# Patient Record
Sex: FEMALE | Race: WHITE
[De-identification: names, ages, dates, MRNs, and addresses within clinical notes are randomized per-mention and may not be internally consistent; named-entity substitution may affect disease eponyms.]

---

## 2017-07-11 ENCOUNTER — HOSPITAL ENCOUNTER (OUTPATIENT)
Dept: HOSPITAL 19 - SDCO | Age: 62
Discharge: HOME | End: 2017-07-11
Attending: SURGERY
Payer: COMMERCIAL

## 2017-07-11 VITALS — SYSTOLIC BLOOD PRESSURE: 92 MMHG | DIASTOLIC BLOOD PRESSURE: 64 MMHG | HEART RATE: 51 BPM

## 2017-07-11 VITALS — HEART RATE: 87 BPM | TEMPERATURE: 98.3 F | DIASTOLIC BLOOD PRESSURE: 75 MMHG | SYSTOLIC BLOOD PRESSURE: 112 MMHG

## 2017-07-11 VITALS — DIASTOLIC BLOOD PRESSURE: 63 MMHG | SYSTOLIC BLOOD PRESSURE: 95 MMHG | HEART RATE: 67 BPM

## 2017-07-11 VITALS — HEART RATE: 50 BPM | DIASTOLIC BLOOD PRESSURE: 67 MMHG | SYSTOLIC BLOOD PRESSURE: 98 MMHG

## 2017-07-11 VITALS — TEMPERATURE: 97.7 F | DIASTOLIC BLOOD PRESSURE: 77 MMHG | HEART RATE: 69 BPM | SYSTOLIC BLOOD PRESSURE: 107 MMHG

## 2017-07-11 VITALS — HEIGHT: 67 IN | WEIGHT: 192.24 LBS | BODY MASS INDEX: 30.17 KG/M2

## 2017-07-11 VITALS — HEART RATE: 68 BPM | DIASTOLIC BLOOD PRESSURE: 74 MMHG | SYSTOLIC BLOOD PRESSURE: 104 MMHG

## 2017-07-11 VITALS — SYSTOLIC BLOOD PRESSURE: 99 MMHG | DIASTOLIC BLOOD PRESSURE: 66 MMHG | HEART RATE: 84 BPM

## 2017-07-11 DIAGNOSIS — M47.896: ICD-10-CM

## 2017-07-11 DIAGNOSIS — J84.9: ICD-10-CM

## 2017-07-11 DIAGNOSIS — Z80.0: Primary | ICD-10-CM

## 2017-07-11 DIAGNOSIS — E11.8: ICD-10-CM

## 2017-07-11 DIAGNOSIS — E78.5: ICD-10-CM

## 2017-07-11 DIAGNOSIS — M06.9: ICD-10-CM

## 2017-07-11 DIAGNOSIS — I10: ICD-10-CM

## 2017-07-11 DIAGNOSIS — R10.32: ICD-10-CM

## 2017-07-11 DIAGNOSIS — J45.909: ICD-10-CM

## 2017-07-11 DIAGNOSIS — I73.00: ICD-10-CM

## 2017-07-11 DIAGNOSIS — G47.30: ICD-10-CM

## 2017-07-11 DIAGNOSIS — G89.29: ICD-10-CM

## 2017-07-11 DIAGNOSIS — K92.1: ICD-10-CM

## 2017-07-11 DIAGNOSIS — M47.892: ICD-10-CM

## 2017-08-14 ENCOUNTER — HOSPITAL ENCOUNTER (OUTPATIENT)
Dept: HOSPITAL 19 - COL.RAD | Age: 62
End: 2017-08-14
Attending: PHYSICIAN ASSISTANT
Payer: COMMERCIAL

## 2017-08-14 DIAGNOSIS — R90.82: Primary | ICD-10-CM

## 2017-08-14 PROCEDURE — A9585 GADOBUTROL INJECTION: HCPCS

## 2017-08-18 ENCOUNTER — HOSPITAL ENCOUNTER (OUTPATIENT)
Dept: HOSPITAL 19 - MC.RAD | Age: 62
End: 2017-08-18
Attending: INTERNAL MEDICINE
Payer: COMMERCIAL

## 2017-08-18 DIAGNOSIS — Z12.31: Primary | ICD-10-CM

## 2017-10-26 ENCOUNTER — HOSPITAL ENCOUNTER (INPATIENT)
Dept: HOSPITAL 19 - JCC | Age: 62
LOS: 49 days | Discharge: HOME | DRG: 470 | End: 2017-12-14
Attending: ORTHOPAEDIC SURGERY | Admitting: ORTHOPAEDIC SURGERY
Payer: COMMERCIAL

## 2017-10-26 VITALS — WEIGHT: 204.59 LBS | BODY MASS INDEX: 32.11 KG/M2 | HEIGHT: 67 IN

## 2017-10-26 DIAGNOSIS — M17.11: Primary | ICD-10-CM

## 2017-10-26 DIAGNOSIS — M06.9: ICD-10-CM

## 2017-10-26 DIAGNOSIS — E11.9: ICD-10-CM

## 2017-10-26 DIAGNOSIS — I73.00: ICD-10-CM

## 2017-10-26 PROCEDURE — A4314 CATH W/DRAINAGE 2-WAY LATEX: HCPCS

## 2017-10-26 PROCEDURE — A9284 NON-ELECTRONIC SPIROMETER: HCPCS

## 2017-10-26 PROCEDURE — C1713 ANCHOR/SCREW BN/BN,TIS/BN: HCPCS

## 2017-10-26 PROCEDURE — C1776 JOINT DEVICE (IMPLANTABLE): HCPCS

## 2017-12-08 ENCOUNTER — HOSPITAL ENCOUNTER (OUTPATIENT)
Dept: HOSPITAL 19 - MKS.ESL.PT | Age: 62
LOS: 5 days | Discharge: HOME | End: 2017-12-13
Attending: INTERNAL MEDICINE
Payer: COMMERCIAL

## 2017-12-08 ENCOUNTER — HOSPITAL ENCOUNTER (OUTPATIENT)
Dept: HOSPITAL 19 - COL.LAB | Age: 62
End: 2017-12-08
Attending: ORTHOPAEDIC SURGERY
Payer: COMMERCIAL

## 2017-12-08 DIAGNOSIS — M17.11: ICD-10-CM

## 2017-12-08 DIAGNOSIS — Z79.82: ICD-10-CM

## 2017-12-08 DIAGNOSIS — M54.2: Primary | ICD-10-CM

## 2017-12-08 DIAGNOSIS — Z79.84: ICD-10-CM

## 2017-12-08 DIAGNOSIS — Z01.812: Primary | ICD-10-CM

## 2017-12-12 VITALS — SYSTOLIC BLOOD PRESSURE: 103 MMHG | HEART RATE: 45 BPM | DIASTOLIC BLOOD PRESSURE: 67 MMHG | TEMPERATURE: 98 F

## 2017-12-12 VITALS — SYSTOLIC BLOOD PRESSURE: 107 MMHG | HEART RATE: 66 BPM | DIASTOLIC BLOOD PRESSURE: 69 MMHG | TEMPERATURE: 98 F

## 2017-12-12 VITALS — DIASTOLIC BLOOD PRESSURE: 71 MMHG | HEART RATE: 44 BPM | TEMPERATURE: 98.2 F | SYSTOLIC BLOOD PRESSURE: 116 MMHG

## 2017-12-12 VITALS — SYSTOLIC BLOOD PRESSURE: 116 MMHG | DIASTOLIC BLOOD PRESSURE: 71 MMHG | HEART RATE: 50 BPM | TEMPERATURE: 98 F

## 2017-12-12 VITALS — HEART RATE: 50 BPM | DIASTOLIC BLOOD PRESSURE: 53 MMHG | SYSTOLIC BLOOD PRESSURE: 110 MMHG | TEMPERATURE: 98 F

## 2017-12-12 VITALS — TEMPERATURE: 98.6 F

## 2017-12-12 VITALS — TEMPERATURE: 98.3 F | HEART RATE: 87 BPM | DIASTOLIC BLOOD PRESSURE: 72 MMHG | SYSTOLIC BLOOD PRESSURE: 116 MMHG

## 2017-12-12 VITALS — TEMPERATURE: 98.6 F | SYSTOLIC BLOOD PRESSURE: 102 MMHG | DIASTOLIC BLOOD PRESSURE: 58 MMHG | HEART RATE: 57 BPM

## 2017-12-12 VITALS — HEART RATE: 68 BPM | SYSTOLIC BLOOD PRESSURE: 110 MMHG | DIASTOLIC BLOOD PRESSURE: 70 MMHG | TEMPERATURE: 97.7 F

## 2017-12-12 VITALS — SYSTOLIC BLOOD PRESSURE: 110 MMHG | TEMPERATURE: 98 F | HEART RATE: 68 BPM | DIASTOLIC BLOOD PRESSURE: 70 MMHG

## 2017-12-12 VITALS — HEART RATE: 63 BPM | TEMPERATURE: 98 F | SYSTOLIC BLOOD PRESSURE: 139 MMHG | DIASTOLIC BLOOD PRESSURE: 72 MMHG

## 2017-12-12 VITALS — HEART RATE: 62 BPM | TEMPERATURE: 98 F | DIASTOLIC BLOOD PRESSURE: 60 MMHG | SYSTOLIC BLOOD PRESSURE: 103 MMHG

## 2017-12-12 VITALS — SYSTOLIC BLOOD PRESSURE: 117 MMHG | TEMPERATURE: 98 F | DIASTOLIC BLOOD PRESSURE: 61 MMHG | HEART RATE: 92 BPM

## 2017-12-12 PROCEDURE — 0SBC0ZX EXCISION OF RIGHT KNEE JOINT, OPEN APPROACH, DIAGNOSTIC: ICD-10-PCS | Performed by: ORTHOPAEDIC SURGERY

## 2017-12-12 PROCEDURE — 0SRC0J9 REPLACEMENT OF RIGHT KNEE JOINT WITH SYNTHETIC SUBSTITUTE, CEMENTED, OPEN APPROACH: ICD-10-PCS | Performed by: ORTHOPAEDIC SURGERY

## 2017-12-13 VITALS — HEART RATE: 57 BPM | TEMPERATURE: 97 F | DIASTOLIC BLOOD PRESSURE: 59 MMHG | SYSTOLIC BLOOD PRESSURE: 109 MMHG

## 2017-12-13 VITALS — HEART RATE: 54 BPM | SYSTOLIC BLOOD PRESSURE: 102 MMHG | DIASTOLIC BLOOD PRESSURE: 69 MMHG | TEMPERATURE: 98.7 F

## 2017-12-13 VITALS — DIASTOLIC BLOOD PRESSURE: 56 MMHG | HEART RATE: 50 BPM | SYSTOLIC BLOOD PRESSURE: 109 MMHG | TEMPERATURE: 98 F

## 2017-12-13 VITALS — TEMPERATURE: 97.9 F | SYSTOLIC BLOOD PRESSURE: 113 MMHG | DIASTOLIC BLOOD PRESSURE: 59 MMHG | HEART RATE: 52 BPM

## 2017-12-13 VITALS — HEART RATE: 59 BPM | DIASTOLIC BLOOD PRESSURE: 59 MMHG | TEMPERATURE: 98.7 F | SYSTOLIC BLOOD PRESSURE: 107 MMHG

## 2017-12-13 LAB
HCT VFR BLD AUTO: 35.8 % (ref 37–47)
HGB BLD-MCNC: 12 G/DL (ref 12.5–16)

## 2017-12-14 VITALS — DIASTOLIC BLOOD PRESSURE: 64 MMHG | TEMPERATURE: 98.5 F | SYSTOLIC BLOOD PRESSURE: 101 MMHG | HEART RATE: 55 BPM

## 2017-12-14 VITALS — SYSTOLIC BLOOD PRESSURE: 121 MMHG | HEART RATE: 59 BPM | TEMPERATURE: 98.4 F | DIASTOLIC BLOOD PRESSURE: 65 MMHG

## 2017-12-14 VITALS — DIASTOLIC BLOOD PRESSURE: 64 MMHG | TEMPERATURE: 98.2 F | HEART RATE: 50 BPM | SYSTOLIC BLOOD PRESSURE: 109 MMHG

## 2017-12-29 ENCOUNTER — HOSPITAL ENCOUNTER (OUTPATIENT)
Dept: HOSPITAL 19 - COL.VAS | Age: 62
End: 2017-12-29
Attending: ORTHOPAEDIC SURGERY
Payer: COMMERCIAL

## 2017-12-29 DIAGNOSIS — M79.604: ICD-10-CM

## 2017-12-29 DIAGNOSIS — M79.89: Primary | ICD-10-CM

## 2017-12-29 DIAGNOSIS — Z96.651: ICD-10-CM

## 2018-03-16 ENCOUNTER — HOSPITAL ENCOUNTER (OUTPATIENT)
Dept: HOSPITAL 19 - MKS.ESL.PT | Age: 63
LOS: 2 days | Discharge: HOME | End: 2018-03-18
Attending: ORTHOPAEDIC SURGERY
Payer: COMMERCIAL

## 2018-03-16 DIAGNOSIS — Z96.651: ICD-10-CM

## 2018-03-16 DIAGNOSIS — Z47.1: Primary | ICD-10-CM

## 2018-04-03 ENCOUNTER — HOSPITAL ENCOUNTER (OUTPATIENT)
Dept: HOSPITAL 19 - MKS.ESL.PT | Age: 63
LOS: 1 days | Discharge: HOME | End: 2018-04-04
Attending: ORTHOPAEDIC SURGERY
Payer: COMMERCIAL

## 2018-04-03 DIAGNOSIS — Z96.651: ICD-10-CM

## 2018-04-03 DIAGNOSIS — Z47.1: Primary | ICD-10-CM

## 2018-04-18 ENCOUNTER — HOSPITAL ENCOUNTER (OUTPATIENT)
Dept: HOSPITAL 19 - COL.RAD | Age: 63
End: 2018-04-18
Attending: ORTHOPAEDIC SURGERY
Payer: COMMERCIAL

## 2018-04-18 DIAGNOSIS — Z96.651: ICD-10-CM

## 2018-04-18 DIAGNOSIS — M79.89: Primary | ICD-10-CM

## 2018-04-18 DIAGNOSIS — M25.561: ICD-10-CM

## 2018-06-29 ENCOUNTER — HOSPITAL ENCOUNTER (OUTPATIENT)
Dept: HOSPITAL 19 - COL.RAD | Age: 63
End: 2018-06-29
Attending: ORTHOPAEDIC SURGERY
Payer: COMMERCIAL

## 2018-06-29 DIAGNOSIS — S93.492A: ICD-10-CM

## 2018-06-29 DIAGNOSIS — M72.2: Primary | ICD-10-CM

## 2018-06-29 DIAGNOSIS — S86.312A: ICD-10-CM

## 2019-03-07 ENCOUNTER — HOSPITAL ENCOUNTER (OUTPATIENT)
Dept: HOSPITAL 19 - WSC | Age: 64
LOS: 3 days | Discharge: HOME | End: 2019-03-10
Attending: INTERNAL MEDICINE
Payer: COMMERCIAL

## 2019-03-07 DIAGNOSIS — M19.90: ICD-10-CM

## 2019-03-07 DIAGNOSIS — M06.9: Primary | ICD-10-CM

## 2019-03-14 ENCOUNTER — HOSPITAL ENCOUNTER (OUTPATIENT)
Dept: HOSPITAL 19 - WSC | Age: 64
LOS: 90 days | End: 2019-06-12
Attending: INTERNAL MEDICINE
Payer: COMMERCIAL

## 2019-03-14 DIAGNOSIS — M06.9: Primary | ICD-10-CM

## 2019-03-14 DIAGNOSIS — M19.90: ICD-10-CM

## 2019-09-11 ENCOUNTER — HOSPITAL ENCOUNTER (OUTPATIENT)
Dept: HOSPITAL 19 - COL.RAD | Age: 64
End: 2019-09-11
Attending: ORTHOPAEDIC SURGERY
Payer: COMMERCIAL

## 2019-09-11 DIAGNOSIS — M43.16: ICD-10-CM

## 2019-09-11 DIAGNOSIS — M51.36: Primary | ICD-10-CM

## 2019-09-11 DIAGNOSIS — M12.88: ICD-10-CM

## 2019-09-11 DIAGNOSIS — M48.061: ICD-10-CM

## 2019-12-31 ENCOUNTER — HOSPITAL ENCOUNTER (OUTPATIENT)
Dept: HOSPITAL 19 - MC.RAD | Age: 64
End: 2019-12-31
Attending: INTERNAL MEDICINE
Payer: COMMERCIAL

## 2019-12-31 DIAGNOSIS — Z12.31: Primary | ICD-10-CM

## 2020-01-23 ENCOUNTER — HOSPITAL ENCOUNTER (OUTPATIENT)
Dept: HOSPITAL 19 - MC.RAD | Age: 65
End: 2020-01-23
Attending: INTERNAL MEDICINE
Payer: COMMERCIAL

## 2020-01-23 DIAGNOSIS — N63.20: Primary | ICD-10-CM

## 2020-07-10 ENCOUNTER — HOSPITAL ENCOUNTER (OUTPATIENT)
Dept: HOSPITAL 19 - MC.RAD | Age: 65
End: 2020-07-10
Attending: INTERNAL MEDICINE
Payer: COMMERCIAL

## 2020-07-10 DIAGNOSIS — M06.9: ICD-10-CM

## 2020-07-10 DIAGNOSIS — M06.39: ICD-10-CM

## 2020-07-10 DIAGNOSIS — N63.22: Primary | ICD-10-CM

## 2020-12-17 ENCOUNTER — HOSPITAL ENCOUNTER (OUTPATIENT)
Dept: HOSPITAL 19 - COL.RAD | Age: 65
End: 2020-12-17
Attending: ORTHOPAEDIC SURGERY
Payer: COMMERCIAL

## 2020-12-17 DIAGNOSIS — M84.374A: Primary | ICD-10-CM

## 2021-01-05 ENCOUNTER — HOSPITAL ENCOUNTER (OUTPATIENT)
Dept: HOSPITAL 19 - MC.RAD | Age: 66
End: 2021-01-05
Attending: INTERNAL MEDICINE
Payer: COMMERCIAL

## 2021-01-05 DIAGNOSIS — Z12.31: Primary | ICD-10-CM

## 2022-03-22 ENCOUNTER — HOSPITAL ENCOUNTER (OUTPATIENT)
Dept: HOSPITAL 19 - MC.RAD | Age: 67
End: 2022-03-22
Attending: INTERNAL MEDICINE
Payer: COMMERCIAL

## 2022-03-22 DIAGNOSIS — Z12.31: Primary | ICD-10-CM

## 2024-09-24 ENCOUNTER — HOSPITAL ENCOUNTER (OUTPATIENT)
Dept: HOSPITAL 19 - SDCO | Age: 69
LOS: 1 days | Discharge: HOME | End: 2024-09-25
Attending: UROLOGY
Payer: COMMERCIAL

## 2024-09-24 VITALS — DIASTOLIC BLOOD PRESSURE: 64 MMHG | SYSTOLIC BLOOD PRESSURE: 121 MMHG | TEMPERATURE: 98.1 F | HEART RATE: 81 BPM

## 2024-09-24 VITALS — HEART RATE: 72 BPM | DIASTOLIC BLOOD PRESSURE: 60 MMHG | TEMPERATURE: 98.1 F | SYSTOLIC BLOOD PRESSURE: 115 MMHG

## 2024-09-24 VITALS — HEART RATE: 79 BPM | DIASTOLIC BLOOD PRESSURE: 62 MMHG | TEMPERATURE: 98.1 F | SYSTOLIC BLOOD PRESSURE: 115 MMHG

## 2024-09-24 VITALS — WEIGHT: 212.53 LBS | BODY MASS INDEX: 34.16 KG/M2 | HEIGHT: 66 IN

## 2024-09-24 VITALS — SYSTOLIC BLOOD PRESSURE: 119 MMHG | DIASTOLIC BLOOD PRESSURE: 60 MMHG | HEART RATE: 72 BPM | TEMPERATURE: 98.1 F

## 2024-09-24 VITALS — DIASTOLIC BLOOD PRESSURE: 69 MMHG | HEART RATE: 89 BPM | SYSTOLIC BLOOD PRESSURE: 109 MMHG | TEMPERATURE: 98.8 F

## 2024-09-24 VITALS — DIASTOLIC BLOOD PRESSURE: 72 MMHG | HEART RATE: 81 BPM | TEMPERATURE: 98.5 F | SYSTOLIC BLOOD PRESSURE: 109 MMHG

## 2024-09-24 VITALS — HEART RATE: 78 BPM | DIASTOLIC BLOOD PRESSURE: 61 MMHG | TEMPERATURE: 98.1 F | SYSTOLIC BLOOD PRESSURE: 120 MMHG

## 2024-09-24 VITALS — HEART RATE: 78 BPM | DIASTOLIC BLOOD PRESSURE: 61 MMHG | SYSTOLIC BLOOD PRESSURE: 113 MMHG | TEMPERATURE: 98.1 F

## 2024-09-24 VITALS — SYSTOLIC BLOOD PRESSURE: 109 MMHG

## 2024-09-24 VITALS — SYSTOLIC BLOOD PRESSURE: 129 MMHG | TEMPERATURE: 97.4 F | HEART RATE: 87 BPM | DIASTOLIC BLOOD PRESSURE: 72 MMHG

## 2024-09-24 VITALS — HEART RATE: 72 BPM | SYSTOLIC BLOOD PRESSURE: 119 MMHG | TEMPERATURE: 98.1 F | DIASTOLIC BLOOD PRESSURE: 59 MMHG

## 2024-09-24 DIAGNOSIS — E11.9: ICD-10-CM

## 2024-09-24 DIAGNOSIS — D30.01: Primary | ICD-10-CM

## 2024-09-24 LAB
ANION GAP SERPL CALC-SCNC: 10 MMOL/L (ref 7–16)
BUN SERPL-MCNC: 16 MG/DL (ref 10–20)
CALCIUM SERPL-MCNC: 8.6 MG/DL (ref 8.4–10.2)
CHLORIDE SERPL-SCNC: 110 MEQ/L (ref 98–107)
CREAT SERPL-SCNC: 0.85 MG/DL (ref 0.57–1.11)
GLUCOSE SERPL-MCNC: 203 MG/DL (ref 70–99)
POTASSIUM SERPL-SCNC: 4.2 MEQ/L (ref 3.5–4.5)
SODIUM SERPL-SCNC: 141 MEQ/L (ref 136–145)

## 2024-09-24 PROCEDURE — A9284 NON-ELECTRONIC SPIROMETER: HCPCS

## 2024-09-24 PROCEDURE — A4314 CATH W/DRAINAGE 2-WAY LATEX: HCPCS

## 2024-09-24 NOTE — NUR
Patient to room 327 from the PACU. Alert, but drowsy. Family at the bedside.
VSS. IV CDI, fluids by gravity. Lap sitesx7 CDI. Walker intact. [place] over
ABD for comfort. Post op VS  monitored. Nurse oriented the patient to
location, room and call light. Call light within reach

## 2024-09-24 NOTE — NUR
met with patient and her , Vijay, P# [phone] or his
work P# is [phone], to discuss discharge planning.  Patient lives in
[place] with her .  PCP is Dr. Holguin, Pharmacy is [facility].
 No issues affording medications.  INsurance is [employer].  DPOA-HC is Vijay.  No
DME, patient reports to be independent with [facility].  Patient reports she has
transportation to get to and from appointments. Patient would like to return
home at time of discharge.
 
Discharge plan: Home

## 2024-09-25 VITALS — DIASTOLIC BLOOD PRESSURE: 74 MMHG | TEMPERATURE: 98.2 F | SYSTOLIC BLOOD PRESSURE: 130 MMHG | HEART RATE: 75 BPM

## 2024-09-25 VITALS — HEART RATE: 84 BPM | SYSTOLIC BLOOD PRESSURE: 109 MMHG | DIASTOLIC BLOOD PRESSURE: 61 MMHG | TEMPERATURE: 98.9 F

## 2024-09-25 VITALS — SYSTOLIC BLOOD PRESSURE: 109 MMHG

## 2024-09-25 VITALS — SYSTOLIC BLOOD PRESSURE: 112 MMHG | HEART RATE: 75 BPM | DIASTOLIC BLOOD PRESSURE: 69 MMHG | TEMPERATURE: 98.1 F

## 2024-09-25 VITALS — DIASTOLIC BLOOD PRESSURE: 67 MMHG | TEMPERATURE: 98 F | SYSTOLIC BLOOD PRESSURE: 103 MMHG | HEART RATE: 75 BPM

## 2024-09-25 VITALS — SYSTOLIC BLOOD PRESSURE: 130 MMHG

## 2024-09-25 VITALS — SYSTOLIC BLOOD PRESSURE: 125 MMHG

## 2024-09-25 VITALS — TEMPERATURE: 98.5 F | DIASTOLIC BLOOD PRESSURE: 75 MMHG | SYSTOLIC BLOOD PRESSURE: 125 MMHG | HEART RATE: 86 BPM

## 2024-09-25 VITALS — SYSTOLIC BLOOD PRESSURE: 112 MMHG

## 2024-09-25 LAB
BASOPHILS # BLD: 0 K/MM3 (ref 0–0.2)
BASOPHILS NFR BLD AUTO: 0.1 % (ref 0–2)
EOSINOPHIL # BLD: 0 K/MM3 (ref 0–0.7)
EOSINOPHIL NFR BLD: 0 % (ref 0–4)
ERYTHROCYTE [DISTWIDTH] IN BLOOD BY AUTOMATED COUNT: 11.9 % (ref 11.5–14.5)
GRANULOCYTES # BLD AUTO: 85.8 % (ref 42.2–75.2)
HCT VFR BLD AUTO: 33.9 % (ref 37–47)
HGB BLD-MCNC: 11.8 G/DL (ref 12.5–16)
LYMPHOCYTES # BLD: 1.4 K/MM3 (ref 1.2–3.4)
LYMPHOCYTES NFR BLD: 9.5 % (ref 20–51)
MCH RBC QN AUTO: 30 PG (ref 27–31)
MCHC RBC AUTO-ENTMCNC: 35 G/DL (ref 33–37)
MCV RBC AUTO: 87 FL (ref 80–100)
MONOCYTES # BLD: 0.6 K/MM3 (ref 0.1–0.6)
MONOCYTES NFR BLD AUTO: 3.9 % (ref 1.7–9.3)
NEUTROPHILS # BLD: 12.2 K/MM3 (ref 1.4–6.5)
PLATELET # BLD AUTO: 171 K/MM3 (ref 130–400)
PMV BLD AUTO: 9.3 FL (ref 7.4–10.4)
RBC # BLD AUTO: 3.9 M/MM3 (ref 4.1–5.3)

## 2024-09-25 NOTE — NUR
DISCHARGE INSTRUCTIONS REVIEWED WITH PT. QUESTIONS SOLICITED AND ANSWERED. PT
LEFT UNIT PER WHEEL CHAIR.

## 2024-09-25 NOTE — NUR
PT RESTING IN BED. DR SINGH IN TO ROUND AND GIVE NEW ORDERS. VERDIN CATHETER
DISCONTINUED. PT TOLERATED WELL. PT AMBULATING IN [place] INDEPENDENTLY WITH
FAMILY. EATING AND DRINKING NO N/V.

## 2024-09-25 NOTE — NUR
REQUESTED AND GIVEN PAIN MEDS FOR COMFORT MAINTENANCE. DENIES NAUSEA, IV
FLUIDS INFUSING WITH NO PROBLEMS. VERDIN TO DD DRAINING CLEAR YELLOW.

## 2024-09-25 NOTE — NUR
REPORTED GAS PAIN TO ABD, SEE MAR FOR MORPHINE IV GIVEN FOR LEVEL 7/10 PAIN.
DENIES NAUSEA. DENIES ANY OTHER NEEDS AT THIS TIME. IV FLUIDS INFUSING WITH NO
PROBLEMS. WARM BLANKET TO ABD APPLIED FOR COMFORT AS WELL. declines

## 2024-09-25 NOTE — NUR
DISCHARGE INSTRUCTIONS REVIEWED WITH PT'S SON. VERBALIZED UNDERSTANDING LEFT
FLOOR PER WHEEL CHAIR WITH STAFF